# Patient Record
Sex: MALE | Race: WHITE | NOT HISPANIC OR LATINO | Employment: OTHER | URBAN - METROPOLITAN AREA
[De-identification: names, ages, dates, MRNs, and addresses within clinical notes are randomized per-mention and may not be internally consistent; named-entity substitution may affect disease eponyms.]

---

## 2025-01-04 ENCOUNTER — APPOINTMENT (OUTPATIENT)
Dept: CARDIOLOGY | Facility: HOSPITAL | Age: 70
End: 2025-01-04
Payer: MEDICARE

## 2025-01-04 ENCOUNTER — APPOINTMENT (OUTPATIENT)
Dept: RADIOLOGY | Facility: HOSPITAL | Age: 70
End: 2025-01-04
Payer: MEDICARE

## 2025-01-04 ENCOUNTER — HOSPITAL ENCOUNTER (EMERGENCY)
Facility: HOSPITAL | Age: 70
Discharge: HOME | End: 2025-01-04
Attending: EMERGENCY MEDICINE
Payer: MEDICARE

## 2025-01-04 VITALS
BODY MASS INDEX: 27.16 KG/M2 | HEIGHT: 71 IN | RESPIRATION RATE: 20 BRPM | WEIGHT: 194 LBS | DIASTOLIC BLOOD PRESSURE: 71 MMHG | HEART RATE: 55 BPM | SYSTOLIC BLOOD PRESSURE: 124 MMHG | TEMPERATURE: 98.1 F | OXYGEN SATURATION: 100 %

## 2025-01-04 DIAGNOSIS — H83.03 ACUTE LABYRINTHITIS, BILATERAL: ICD-10-CM

## 2025-01-04 DIAGNOSIS — R07.9 CHEST PAIN, UNSPECIFIED TYPE: Primary | ICD-10-CM

## 2025-01-04 LAB
ALBUMIN SERPL BCP-MCNC: 4.4 G/DL (ref 3.4–5)
ALP SERPL-CCNC: 61 U/L (ref 33–136)
ALT SERPL W P-5'-P-CCNC: 20 U/L (ref 10–52)
ANION GAP SERPL CALC-SCNC: 10 MMOL/L (ref 10–20)
AST SERPL W P-5'-P-CCNC: 20 U/L (ref 9–39)
BASOPHILS # BLD AUTO: 0.06 X10*3/UL (ref 0–0.1)
BASOPHILS NFR BLD AUTO: 1 %
BILIRUB SERPL-MCNC: 0.5 MG/DL (ref 0–1.2)
BUN SERPL-MCNC: 14 MG/DL (ref 6–23)
CALCIUM SERPL-MCNC: 9 MG/DL (ref 8.6–10.3)
CARDIAC TROPONIN I PNL SERPL HS: 6 NG/L (ref 0–20)
CARDIAC TROPONIN I PNL SERPL HS: 9 NG/L (ref 0–20)
CHLORIDE SERPL-SCNC: 104 MMOL/L (ref 98–107)
CO2 SERPL-SCNC: 28 MMOL/L (ref 21–32)
CREAT SERPL-MCNC: 1.02 MG/DL (ref 0.5–1.3)
EGFRCR SERPLBLD CKD-EPI 2021: 79 ML/MIN/1.73M*2
EOSINOPHIL # BLD AUTO: 0.11 X10*3/UL (ref 0–0.7)
EOSINOPHIL NFR BLD AUTO: 1.8 %
ERYTHROCYTE [DISTWIDTH] IN BLOOD BY AUTOMATED COUNT: 12 % (ref 11.5–14.5)
FLUAV RNA RESP QL NAA+PROBE: NOT DETECTED
FLUBV RNA RESP QL NAA+PROBE: NOT DETECTED
GLUCOSE SERPL-MCNC: 73 MG/DL (ref 74–99)
HCT VFR BLD AUTO: 46.6 % (ref 41–52)
HGB BLD-MCNC: 15.5 G/DL (ref 13.5–17.5)
IMM GRANULOCYTES # BLD AUTO: 0.02 X10*3/UL (ref 0–0.7)
IMM GRANULOCYTES NFR BLD AUTO: 0.3 % (ref 0–0.9)
LYMPHOCYTES # BLD AUTO: 1.07 X10*3/UL (ref 1.2–4.8)
LYMPHOCYTES NFR BLD AUTO: 17.9 %
MCH RBC QN AUTO: 30.1 PG (ref 26–34)
MCHC RBC AUTO-ENTMCNC: 33.3 G/DL (ref 32–36)
MCV RBC AUTO: 91 FL (ref 80–100)
MONOCYTES # BLD AUTO: 0.42 X10*3/UL (ref 0.1–1)
MONOCYTES NFR BLD AUTO: 7 %
NEUTROPHILS # BLD AUTO: 4.31 X10*3/UL (ref 1.2–7.7)
NEUTROPHILS NFR BLD AUTO: 72 %
NRBC BLD-RTO: 0 /100 WBCS (ref 0–0)
PLATELET # BLD AUTO: 241 X10*3/UL (ref 150–450)
POTASSIUM SERPL-SCNC: 4 MMOL/L (ref 3.5–5.3)
PROT SERPL-MCNC: 7.1 G/DL (ref 6.4–8.2)
RBC # BLD AUTO: 5.15 X10*6/UL (ref 4.5–5.9)
SARS-COV-2 RNA RESP QL NAA+PROBE: NOT DETECTED
SODIUM SERPL-SCNC: 138 MMOL/L (ref 136–145)
WBC # BLD AUTO: 6 X10*3/UL (ref 4.4–11.3)

## 2025-01-04 PROCEDURE — 99285 EMERGENCY DEPT VISIT HI MDM: CPT | Mod: 25 | Performed by: EMERGENCY MEDICINE

## 2025-01-04 PROCEDURE — 87636 SARSCOV2 & INF A&B AMP PRB: CPT

## 2025-01-04 PROCEDURE — 70498 CT ANGIOGRAPHY NECK: CPT | Performed by: RADIOLOGY

## 2025-01-04 PROCEDURE — 93005 ELECTROCARDIOGRAM TRACING: CPT

## 2025-01-04 PROCEDURE — 70450 CT HEAD/BRAIN W/O DYE: CPT | Performed by: RADIOLOGY

## 2025-01-04 PROCEDURE — 70498 CT ANGIOGRAPHY NECK: CPT

## 2025-01-04 PROCEDURE — 84484 ASSAY OF TROPONIN QUANT: CPT | Performed by: PHYSICIAN ASSISTANT

## 2025-01-04 PROCEDURE — 70496 CT ANGIOGRAPHY HEAD: CPT | Performed by: RADIOLOGY

## 2025-01-04 PROCEDURE — 80053 COMPREHEN METABOLIC PANEL: CPT | Performed by: PHYSICIAN ASSISTANT

## 2025-01-04 PROCEDURE — 85025 COMPLETE CBC W/AUTO DIFF WBC: CPT | Performed by: PHYSICIAN ASSISTANT

## 2025-01-04 PROCEDURE — 36415 COLL VENOUS BLD VENIPUNCTURE: CPT | Performed by: PHYSICIAN ASSISTANT

## 2025-01-04 PROCEDURE — 70450 CT HEAD/BRAIN W/O DYE: CPT | Mod: 59

## 2025-01-04 PROCEDURE — 71046 X-RAY EXAM CHEST 2 VIEWS: CPT | Performed by: RADIOLOGY

## 2025-01-04 PROCEDURE — 2550000001 HC RX 255 CONTRASTS: Performed by: EMERGENCY MEDICINE

## 2025-01-04 PROCEDURE — 71046 X-RAY EXAM CHEST 2 VIEWS: CPT

## 2025-01-04 RX ORDER — MECLIZINE HYDROCHLORIDE 25 MG/1
25 TABLET ORAL 3 TIMES DAILY PRN
Qty: 30 TABLET | Refills: 0 | Status: SHIPPED | OUTPATIENT
Start: 2025-01-04 | End: 2025-01-14

## 2025-01-04 RX ADMIN — IOHEXOL 90 ML: 350 INJECTION, SOLUTION INTRAVENOUS at 16:49

## 2025-01-04 ASSESSMENT — COLUMBIA-SUICIDE SEVERITY RATING SCALE - C-SSRS
2. HAVE YOU ACTUALLY HAD ANY THOUGHTS OF KILLING YOURSELF?: NO
6. HAVE YOU EVER DONE ANYTHING, STARTED TO DO ANYTHING, OR PREPARED TO DO ANYTHING TO END YOUR LIFE?: NO
1. IN THE PAST MONTH, HAVE YOU WISHED YOU WERE DEAD OR WISHED YOU COULD GO TO SLEEP AND NOT WAKE UP?: NO

## 2025-01-04 ASSESSMENT — HEART SCORE
HISTORY: SLIGHTLY SUSPICIOUS
TROPONIN: LESS THAN OR EQUAL TO NORMAL LIMIT
ECG: NORMAL
RISK FACTORS: NO KNOWN RISK FACTORS
HEART SCORE: 2
AGE: 65+

## 2025-01-04 NOTE — ED TRIAGE NOTES
Patient states he has felt dizzy/headache for a few days. Today went to take out garbage and shovel snow, developed chest tightness.

## 2025-01-04 NOTE — ED TRIAGE NOTES
The patient was seen and examined in triage.    History of Present Illness: The patient is a 69-year-old male presents emergency department due to dizziness for the last 2 to 3 days.  He reports that it feels like a spinning sensation.  He reports that he also has a slight headache.  He reports that he was attributing this to his sinuses because he typically gets sinus infections around this time.  He reports that today he went outside to take the garbage out and shovel snow and he developed chest tightness and some shortness of breath.  He denies associated nausea or vomiting.  He reports since resting, they chest pain and shortness of breath resolved.    Brief Physical Exam:  Exam is limited by the patient sitting in a chair in triage.   Heart: Regular rate and rhythm.   Lungs: Clear to auscultation bilaterally.   Abdomen: Soft, nondistended, normoactive bowel sounds, nontender     Plan: Appropriate labs and diagnostic imaging were ordered.      For the remainder of the patient's workup and ED course, please refer to the main ED provider note. We discussed need for diagnostic testing including laboratory studies and imaging.  We also discussed that they may be asked to wait in the waiting room while these tests are pending.  They understand that if they choose to leave without having the testing completed or resulted that we cannot rule out acute life threatening illnesses and the risks involved could lead to worsening condition, permanent disability or even death.      Disclaimer: This note was dictated by speech recognition. Minor errors in transcription may be present. Please call if questions.

## 2025-01-04 NOTE — DISCHARGE INSTRUCTIONS
Please return if you develop worsening chest pain, shortness of breath, or if you feel you need to be re-evaluated. You should follow up with your doctor or cardiologist in the next 2-3 days if possible.  Given a referral for cardiology.  We sent message to send to [facility] to schedule a stress test within the next 3 days.  Reach out to you to schedule this fairly soon.  We are giving a prescription of meclizine for the vertigo.  Recommend you some instructions on labyrinthitis should resolve likely on its own.  Return to emergency room if you have any new or worsening symptoms.

## 2025-01-04 NOTE — ED PROVIDER NOTES
History of Present Illness     History provided by: Patient  Limitations to History: None  External Records Reviewed with Brief Summary:  None    HPI:  Jayson Birch is a 69 y.o. male with no significant past medical history that presents emergency room for dizziness, headache for the past 3 days.  Patient states that for the past couple days been having some some vertigo has worsened with supination however patient is pending any vertigo currently.  Patient does state that he has some dizziness, lightheadedness on presentation.  Patient has a slight headache rated 3 out of 10 however attributes it to the sinus headache given that he typically has sinus infections around this time.  Patient additionally has been having some congestion however no productive cough.  Denies fevers, chills, runny nose, runny nose.  He states that he took out the garbage started having some chest pain and shortness of breath that lasted for 10 minutes.  On examination is not complaining of any of this chest pain or shortness of breath.  Patient states that he feels foggy.  Patient denies any associated nausea, vomiting.     Physical Exam   Triage vitals:  T 36.7 °C (98.1 °F)  HR 97  /72  RR 19  O2 97 % None (Room air)    General: Awake, alert, in no acute distress  Eyes: Gaze conjugate.  No scleral icterus or injection  HENT: Normo-cephalic, atraumatic. No stridor, bilateral tympanic membrane shows effusions, no erythema, no bulging  CV: Regular rate, regular rhythm. Radial pulses 2+ bilaterally  Resp: Breathing non-labored, speaking in full sentences.  Clear to auscultation bilaterally  GI: Soft, non-distended, non-tender. No rebound or guarding.  : Deferred  MSK/Extremities: No gross bony deformities. Moving all extremities  Skin: Warm. Appropriate color  Neuro: A&Ox3.  No slurred speech.  No facial droop.  Symmetric smile.  Equal cheek puffing.  Normal sensation to light touch in V1-3.  5/5 strength shoulder shrug.   5/5 strength in bilateral UE and LE.  Normal sensation to light touch in bilateral UE and LE. No dysmetria w/ finger-nose-finger or heel-shin bilaterally.  No ataxic gait. No tandem gait abnormalities.  Psych: Appropriate mood and affect    Medical Decision Making & ED Course   Medical Decision Makin y.o. male  with no significant past medical history that presents emergency room for dizziness, headache for the past 3 days.  Patient presents stable, nontoxic-appearing and in no acute distress.  The patient has had some congestion, sinus headaches, additional concern for COVID, influenza however swabs are negative.  Given that patient has had vertigo about 2 days ago with this headache, there is some concern for a posterior vestibular stroke however NIH is negative.  His neuroexam is completely unremarkable.  We did CT head which was initially negative and then we did a CT angio head and neck which was also negative for any acute abnormalities.  Patient is not complaining of any nausea, vomiting, any vertiginous symptoms currently here in the emergency room.  Does have some bilateral fullness of the TM which does make this concerning for viral labyrinthitis.  His chest pain is slightly concerning however patient did have any chest pain, shortness of breath.  EKG which showed no ischemic changes and serial troponins are negative.  Has a heart score of 2 which makes this less concerning for cardiac event within the next 6 weeks.  This is less concerning for ACS however may be stable angina.  Cardiology follow-up in [facility] for a stress test within the next 3 days.  Labs are unremarkable.  Patient is comfortable with this plan and follow-up with cardiologist outpatient.  Patient was given a prescription of meclizine for the vertigo associated with the acute labyrinthitis.  Pt was given strict return precautions to return to emergency room if he has any new or worsening symptoms and any blurred vision,  aphasia, weakness in his upper or lower extremities.  Patient was reassured and understood the instructions.  Patient stable prior to being discharged from emergency room.   ----  Scoring Tools Utilized: HEART Score: 2        Social Determinants of Health which Significantly Impact Care: None identified The following actions were taken to address these social determinants: None    EKG Independent Interpretation: EKG interpreted by myself. Please see ED Course for full interpretation.    Independent Result Review and Interpretation: Relevant laboratory and radiographic results were reviewed and independently interpreted by myself.  As necessary, they are commented on in the ED Course.    Chronic conditions affecting the patient's care: As documented above in [facility]    The patient was discussed with the following consultants/services: None    Care Considerations: As documented above in [facility]    ED Course:  ED Course as of 01/04/25 1958   Sat Jan 04, 2025   1619 CT head shows no acute intracranial pathology. [YG]   1619 Chest x-ray shows no acute cardiopulmonary process [YG]   1625 CBC, CMP, serial troponin is within normal limits.  [YG]   1727 Soft, influenza is negative.  [YG]   1728 CT head and neck with and without contrast shows 1. No intracranial major vascular occlusion.  2. No flow-limiting stenosis or significant plaque irregularity in  the neck.   [YG]   1746 EKG shows normal sinus rhythm, axis deviation, no interval changes, no ST ovation's, no ST depressions, no T wave inversions.  No ischemic changes. [YG]   1800 ED Attending Documentation: This patient was seen by the resident physician.  I have seen and examined the patient, agree with the workup, evaluation, management and diagnosis. I reviewed and edited the above documentation where necessary. I have looked at the EKG and agree with the interpretation. On my evaluation of the patient: Patient presents with 2 major complaints 1 being persistent intermittent  vertigo for the last 4 days, CT CTA here did not show any evidence of findings concerning for posterior stroke.  His symptoms are not persistent he has no other neurosymptoms and has a normal total and normal neuroexam for me.  Does have bilateral swelling of his tympanic membranes concerning for a reason to have labyrinthitis, however he also had some chest pain here.  Heart score is 4, troponins are negative x 2, I do think through shared decision making the patient can be followed up as an outpatient recent email for rapid cardiology follow-up.    Merrlil Valle MD  EM Attending Physician   [RG]      ED Course User Index  [RG] Merrill Valle MD  [YG] Octavia Harrell MD         Diagnoses as of 01/04/25 1958   Chest pain, unspecified type   Acute labyrinthitis, bilateral     Disposition   As a result of the work-up, the patient was discharged home.  he was informed of his diagnosis and instructed to come back with any concerns or worsening of condition.  he and was agreeable to the plan as discussed above.  he was given the opportunity to ask questions.  All of the patient's questions were answered.    Procedures   Procedures    Patient seen and discussed with ED attending physician.    Merrill Valle MD  Emergency Medicine     Octavia Harrell MD  Resident  01/04/25 1811       Merrill Valle MD  01/04/25 1958

## 2025-01-07 ENCOUNTER — OFFICE VISIT (OUTPATIENT)
Dept: CARDIOLOGY | Facility: CLINIC | Age: 70
End: 2025-01-07
Payer: MEDICARE

## 2025-01-07 VITALS
HEART RATE: 68 BPM | HEIGHT: 71 IN | BODY MASS INDEX: 27.27 KG/M2 | RESPIRATION RATE: 18 BRPM | OXYGEN SATURATION: 98 % | WEIGHT: 194.8 LBS | DIASTOLIC BLOOD PRESSURE: 60 MMHG | SYSTOLIC BLOOD PRESSURE: 110 MMHG

## 2025-01-07 DIAGNOSIS — I20.9 ANGINA PECTORIS: Primary | ICD-10-CM

## 2025-01-07 DIAGNOSIS — E78.2 MIXED HYPERLIPIDEMIA: ICD-10-CM

## 2025-01-07 PROBLEM — J31.0 RHINITIS: Status: ACTIVE | Noted: 2023-04-24

## 2025-01-07 PROBLEM — F41.1 GAD (GENERALIZED ANXIETY DISORDER): Status: ACTIVE | Noted: 2023-10-09

## 2025-01-07 PROBLEM — G47.33 OBSTRUCTIVE SLEEP APNEA SYNDROME: Status: ACTIVE | Noted: 2023-10-09

## 2025-01-07 PROBLEM — J32.9 CHRONIC SINUSITIS: Status: ACTIVE | Noted: 2023-04-24

## 2025-01-07 PROBLEM — M72.2 PLANTAR FASCIITIS, BILATERAL: Status: ACTIVE | Noted: 2024-07-15

## 2025-01-07 PROBLEM — R33.9 INCOMPLETE EMPTYING OF BLADDER: Status: ACTIVE | Noted: 2023-10-09

## 2025-01-07 PROBLEM — Z98.890 HISTORY OF NASAL SEPTOPLASTY: Status: ACTIVE | Noted: 2023-04-24

## 2025-01-07 PROBLEM — I20.0 ANGINA PECTORIS, UNSTABLE (MULTI): Status: ACTIVE | Noted: 2025-01-07

## 2025-01-07 PROCEDURE — 1159F MED LIST DOCD IN RCRD: CPT | Performed by: NURSE PRACTITIONER

## 2025-01-07 PROCEDURE — 99204 OFFICE O/P NEW MOD 45 MIN: CPT | Performed by: NURSE PRACTITIONER

## 2025-01-07 PROCEDURE — 3008F BODY MASS INDEX DOCD: CPT | Performed by: NURSE PRACTITIONER

## 2025-01-07 PROCEDURE — 1036F TOBACCO NON-USER: CPT | Performed by: NURSE PRACTITIONER

## 2025-01-07 PROCEDURE — 1160F RVW MEDS BY RX/DR IN RCRD: CPT | Performed by: NURSE PRACTITIONER

## 2025-01-07 PROCEDURE — 93000 ELECTROCARDIOGRAM COMPLETE: CPT | Performed by: NURSE PRACTITIONER

## 2025-01-07 RX ORDER — FLUOXETINE HYDROCHLORIDE 20 MG/1
20 CAPSULE ORAL DAILY
COMMUNITY
Start: 2023-04-24

## 2025-01-07 RX ORDER — MONTELUKAST SODIUM 10 MG/1
10 TABLET ORAL NIGHTLY
COMMUNITY
Start: 2023-04-24

## 2025-01-07 RX ORDER — FLUTICASONE PROPIONATE 50 MCG
1 SPRAY, SUSPENSION (ML) NASAL DAILY
COMMUNITY
Start: 2023-04-24

## 2025-01-07 RX ORDER — ROSUVASTATIN CALCIUM 10 MG/1
10 TABLET, COATED ORAL DAILY
COMMUNITY
Start: 2023-04-24

## 2025-01-07 NOTE — PROGRESS NOTES
Name : Jayson Birch   : 1955   MRN : [id number]   ENC Date : 2025    CC:   NPV- chest pain     HPI:    Jayson Birch is a relatively healthy 69 y.o. male with PMHx only sig for HLD who presents today as above.    On 25, Alfredo was out shoveling snow for about 5 mins when he began having pain & tightness in his left chest & couldn't take a deep breath. He stopped shoveling, went in the house & sat down for about 10-15 mins at which point the pain subsided. He notes that he had been feeling dizzy 4 days leading up to this event.    Went to [facility] for evaluation. Troponins negative. EKG without ischemic changes. Work up was consistent with vertigo & he was given antivert which has worked for him.    No further chest pain, but no routine exercise. Has been able to go up & down steps with laundry at home with no CP, SOB, Diaphoresis    Reports anxiety. POA for an ill friend of his that has been in hospital & causing him a lot of stress & anxiety.    Works part-time as a mobile [employer]    CV Diagnostics:  EKG 25: Sinus Rhythm with poor R wave progression across the precordium, which is likely lead placement    ROS: unless otherwise noted in the history of present illness, all other systems were reviewed and they are negative for complaints     PMH:  As above    PSH:  noncontributory    SHx:  He reports that he has never smoked. He has never used smokeless tobacco. He reports current alcohol use of about 2.0 standard drinks of alcohol per week. He reports that he does not use drugs.    FHx:  Father- Aortic Aneurysm in his 70s    Allergies:  Patient has no known allergies.    Outpatient Medications:  Current Outpatient Medications   Medication Instructions    FLUoxetine (PROZAC) 20 mg, Daily    fluticasone (Flonase) 50 mcg/actuation nasal spray 1 spray, Daily    meclizine (ANTIVERT) 25 mg, oral, 3 times daily PRN    montelukast (SINGULAIR) 10 mg, Nightly    rosuvastatin (CRESTOR) 10 mg, Daily  "    Last Recorded Vitals:  Vitals:    01/07/25 1210   BP: 110/60   BP Location: Left arm   Patient Position: Sitting   Pulse: 68   Resp: 18   SpO2: 98%   Weight: 88.4 kg (194 lb 12.8 oz)   Height: 1.803 m (5' 11\")     Physical Exam:  On exam Mr. Jayson Birch appears his stated age, is alert and oriented x3, and in no acute distress. His sclera are anicteric and his oropharynx has moist mucous membranes. His neck is supple and without thyromegaly. The JVP is ~5 cm of water above the right atrium. His cardiac exam has regular rhythm, normal S1, S2. No S3/4. There are no murmurs. His lungs are clear to auscultation bilaterally and there is no dullness to percussion. His abdomen is soft, nontender with normoactive bowel sounds. There is no HJR. The extremities are warm and without edema. The skin is dry. There is no rash present. The distal pulses are 2-3+ in all four extremities. His mood and affect are appropriate for todays encounter.      Last Labs:  CBC -  Lab Results   Component Value Date    WBC 6.0 01/04/2025    HGB 15.5 01/04/2025    HCT 46.6 01/04/2025    MCV 91 01/04/2025     01/04/2025       CMP -  Lab Results   Component Value Date    CALCIUM 9.0 01/04/2025    PROT 7.1 01/04/2025    ALBUMIN 4.4 01/04/2025    AST 20 01/04/2025    ALT 20 01/04/2025    ALKPHOS 61 01/04/2025    BILITOT 0.5 01/04/2025       LIPID PANEL -   No results found for: \"CHOL\", \"TRIG\", \"HDL\", \"CHHDL\", \"LDLF\", \"VLDL\", \"NHDL\"    RENAL FUNCTION PANEL -   Lab Results   Component Value Date    GLUCOSE 73 (L) 01/04/2025     01/04/2025    K 4.0 01/04/2025     01/04/2025    CO2 28 01/04/2025    ANIONGAP 10 01/04/2025    BUN 14 01/04/2025    CREATININE 1.02 01/04/2025    CALCIUM 9.0 01/04/2025    ALBUMIN 4.4 01/04/2025        No results found for: \"BNP\", \"HGBA1C\"  I have reviewed the above labs & diagnostics    Assessment/Plan:  Angina. EKG without ischemic changes & troponins were negative. His risk factors for CAD " include age & HLD. Given his symptoms I think it is reasonable to further investigate. Recommend nuclear stress test given his issues with balance & recent vertigo    Hyperlipidemia. Last lipid panel 12/2023 with LDL  notable for 127. Continue with Crestor 10mg every day for now. Re-evaluate after lexiscan    Follow up after testing.    Tracy M Schwab, APRN-CNP

## 2025-01-12 LAB
ATRIAL RATE: 84 BPM
P AXIS: 29 DEGREES
P OFFSET: 177 MS
P ONSET: 128 MS
PR INTERVAL: 174 MS
Q ONSET: 215 MS
QRS COUNT: 13 BEATS
QRS DURATION: 80 MS
QT INTERVAL: 366 MS
QTC CALCULATION(BAZETT): 432 MS
QTC FREDERICIA: 409 MS
R AXIS: -57 DEGREES
T AXIS: 40 DEGREES
T OFFSET: 398 MS
VENTRICULAR RATE: 84 BPM

## 2025-01-13 ENCOUNTER — HOSPITAL ENCOUNTER (OUTPATIENT)
Dept: RADIOLOGY | Facility: HOSPITAL | Age: 70
Discharge: HOME | End: 2025-01-13
Payer: MEDICARE

## 2025-01-13 ENCOUNTER — HOSPITAL ENCOUNTER (OUTPATIENT)
Dept: CARDIOLOGY | Facility: HOSPITAL | Age: 70
Discharge: HOME | End: 2025-01-13
Payer: MEDICARE

## 2025-01-13 DIAGNOSIS — I20.9 ANGINA PECTORIS: ICD-10-CM

## 2025-01-13 PROCEDURE — 93017 CV STRESS TEST TRACING ONLY: CPT

## 2025-01-13 PROCEDURE — 93018 CV STRESS TEST I&R ONLY: CPT | Performed by: INTERNAL MEDICINE

## 2025-01-13 PROCEDURE — 93016 CV STRESS TEST SUPVJ ONLY: CPT | Performed by: INTERNAL MEDICINE

## 2025-01-13 PROCEDURE — 78452 HT MUSCLE IMAGE SPECT MULT: CPT

## 2025-01-13 PROCEDURE — 3430000001 HC RX 343 DIAGNOSTIC RADIOPHARMACEUTICALS: Performed by: NURSE PRACTITIONER

## 2025-01-13 PROCEDURE — A9502 TC99M TETROFOSMIN: HCPCS | Performed by: NURSE PRACTITIONER

## 2025-01-13 PROCEDURE — 78452 HT MUSCLE IMAGE SPECT MULT: CPT | Performed by: INTERNAL MEDICINE

## 2025-01-13 RX ADMIN — TETROFOSMIN 10.6 MILLICURIE: 0.23 INJECTION, POWDER, LYOPHILIZED, FOR SOLUTION INTRAVENOUS at 08:00

## 2025-01-13 RX ADMIN — TETROFOSMIN 36 MILLICURIE: 0.23 INJECTION, POWDER, LYOPHILIZED, FOR SOLUTION INTRAVENOUS at 09:30

## 2025-01-14 ENCOUNTER — APPOINTMENT (OUTPATIENT)
Dept: CARDIOLOGY | Facility: CLINIC | Age: 70
End: 2025-01-14
Payer: MEDICARE

## 2025-01-24 ENCOUNTER — APPOINTMENT (OUTPATIENT)
Dept: CARDIOLOGY | Facility: CLINIC | Age: 70
End: 2025-01-24
Payer: MEDICARE

## 2025-01-28 ENCOUNTER — APPOINTMENT (OUTPATIENT)
Dept: CARDIOLOGY | Facility: CLINIC | Age: 70
End: 2025-01-28
Payer: MEDICARE

## 2025-01-28 DIAGNOSIS — E78.2 MIXED HYPERLIPIDEMIA: ICD-10-CM

## 2025-01-28 DIAGNOSIS — R07.9 CHEST PAIN, UNSPECIFIED TYPE: Primary | ICD-10-CM

## 2025-01-28 PROBLEM — I20.0 ANGINA PECTORIS, UNSTABLE (MULTI): Status: RESOLVED | Noted: 2025-01-07 | Resolved: 2025-01-28

## 2025-01-28 PROCEDURE — 99214 OFFICE O/P EST MOD 30 MIN: CPT | Performed by: NURSE PRACTITIONER

## 2025-01-28 PROCEDURE — 1159F MED LIST DOCD IN RCRD: CPT | Performed by: NURSE PRACTITIONER

## 2025-01-28 PROCEDURE — 1036F TOBACCO NON-USER: CPT | Performed by: NURSE PRACTITIONER

## 2025-01-28 PROCEDURE — 1160F RVW MEDS BY RX/DR IN RCRD: CPT | Performed by: NURSE PRACTITIONER

## 2025-01-28 RX ORDER — ROSUVASTATIN CALCIUM 20 MG/1
20 TABLET, COATED ORAL DAILY
Qty: 90 TABLET | Refills: 3 | Status: SHIPPED | OUTPATIENT
Start: 2025-01-28 | End: 2026-01-28

## 2025-01-28 NOTE — PROGRESS NOTES
Name : Jayson Birch   : 1955   MRN : [id number]   ENC Date : 2025    CC:   VV- follow up testing, chest pain     HPI:    Jayson Birch is a relatively healthy 69 y.o. male with PMHx only sig for HLD who presents today as above.    On 25, Alfredo was out shoveling snow for about 5 mins when he began having pain & tightness in his left chest & couldn't take a deep breath. He stopped shoveling, went in the house & sat down for about 10-15 mins at which point the pain subsided. He notes that he had been feeling dizzy 4 days leading up to this event.    Went to [facility] for evaluation. Troponins negative. EKG without ischemic changes. Work up was consistent with vertigo & he was given antivert which has worked for him.    No further chest pain, but no routine exercise. Has been able to go up & down steps with laundry at home with no CP, SOB, Diaphoresis    Reports anxiety. POA for an ill friend of his that has been in hospital & causing him a lot of stress & anxiety.    Works part-time as a mobile [employer]    CV Diagnostics:  Nuclear stress test 25: Normal, EF 65%     EKG 25: Sinus Rhythm with poor R wave progression across the precordium, which is likely lead placement    ROS: unless otherwise noted in the history of present illness, all other systems were reviewed and they are negative for complaints     PMH:  As above    PSH:  noncontributory    SHx:  He reports that he has never smoked. He has never used smokeless tobacco. He reports current alcohol use of about 2.0 standard drinks of alcohol per week. He reports that he does not use drugs.    FHx:  Father- Aortic Aneurysm in his 70s    Allergies:  Patient has no known allergies.    Outpatient Medications:  Current Outpatient Medications   Medication Instructions    FLUoxetine (PROZAC) 20 mg, Daily    fluticasone (Flonase) 50 mcg/actuation nasal spray 1 spray, Daily    montelukast (SINGULAIR) 10 mg, Nightly    rosuvastatin (CRESTOR) 20 mg,  "oral, Daily     Last Recorded Vitals:  There were no vitals filed for this visit.    Physical Exam:  A+Ox3, NAD     Last Labs:  CBC -  Lab Results   Component Value Date    WBC 6.0 01/04/2025    HGB 15.5 01/04/2025    HCT 46.6 01/04/2025    MCV 91 01/04/2025     01/04/2025       CMP -  Lab Results   Component Value Date    CALCIUM 9.0 01/04/2025    PROT 7.1 01/04/2025    ALBUMIN 4.4 01/04/2025    AST 20 01/04/2025    ALT 20 01/04/2025    ALKPHOS 61 01/04/2025    BILITOT 0.5 01/04/2025       LIPID PANEL -   No results found for: \"CHOL\", \"TRIG\", \"HDL\", \"CHHDL\", \"LDLF\", \"VLDL\", \"NHDL\"    RENAL FUNCTION PANEL -   Lab Results   Component Value Date    GLUCOSE 73 (L) 01/04/2025     01/04/2025    K 4.0 01/04/2025     01/04/2025    CO2 28 01/04/2025    ANIONGAP 10 01/04/2025    BUN 14 01/04/2025    CREATININE 1.02 01/04/2025    CALCIUM 9.0 01/04/2025    ALBUMIN 4.4 01/04/2025        No results found for: \"BNP\", \"HGBA1C\"  I have reviewed the above labs & diagnostics    Assessment/Plan:  Angina. EKG without ischemic changes & troponins were negative. His risk factors for CAD include age & HLD. Given his symptoms proceeded with Lexiscan which proved normal, with no e/o ischemia nor infarct.    Hyperlipidemia. Last lipid panel 12/2023 with LDL notable for 127. ASCVD risk of 14.2%, recommend moderate to high intensity statin. Increase crestor to 20mg at bedtime. CMP in 2 weeks. Repeat lipid panel in 6 months.    Follow up with me in 6 months or sooner if needed    Tracy M Schwab, APRN-CNP  "

## 2025-02-11 LAB
ALBUMIN SERPL-MCNC: 4.3 G/DL (ref 3.6–5.1)
ALP SERPL-CCNC: 63 U/L (ref 35–144)
ALT SERPL-CCNC: 16 U/L (ref 9–46)
ANION GAP SERPL CALCULATED.4IONS-SCNC: 6 MMOL/L (CALC) (ref 7–17)
AST SERPL-CCNC: 21 U/L (ref 10–35)
BILIRUB SERPL-MCNC: 0.7 MG/DL (ref 0.2–1.2)
BUN SERPL-MCNC: 17 MG/DL (ref 7–25)
CALCIUM SERPL-MCNC: 9 MG/DL (ref 8.6–10.3)
CHLORIDE SERPL-SCNC: 106 MMOL/L (ref 98–110)
CO2 SERPL-SCNC: 28 MMOL/L (ref 20–32)
CREAT SERPL-MCNC: 0.93 MG/DL (ref 0.7–1.28)
EGFRCR SERPLBLD CKD-EPI 2021: 88 ML/MIN/1.73M2
GLUCOSE SERPL-MCNC: 89 MG/DL (ref 65–99)
POTASSIUM SERPL-SCNC: 4.3 MMOL/L (ref 3.5–5.3)
PROT SERPL-MCNC: 6.4 G/DL (ref 6.1–8.1)
SODIUM SERPL-SCNC: 140 MMOL/L (ref 135–146)

## 2025-07-29 ENCOUNTER — APPOINTMENT (OUTPATIENT)
Dept: CARDIOLOGY | Facility: CLINIC | Age: 70
End: 2025-07-29
Payer: MEDICARE

## 2025-07-29 VITALS
OXYGEN SATURATION: 97 % | HEIGHT: 71 IN | DIASTOLIC BLOOD PRESSURE: 56 MMHG | SYSTOLIC BLOOD PRESSURE: 118 MMHG | HEART RATE: 72 BPM | RESPIRATION RATE: 18 BRPM | WEIGHT: 185.6 LBS | BODY MASS INDEX: 25.98 KG/M2

## 2025-07-29 DIAGNOSIS — R07.9 CHEST PAIN, UNSPECIFIED TYPE: ICD-10-CM

## 2025-07-29 DIAGNOSIS — E78.2 MIXED HYPERLIPIDEMIA: Primary | ICD-10-CM

## 2025-07-29 PROCEDURE — 1036F TOBACCO NON-USER: CPT | Performed by: NURSE PRACTITIONER

## 2025-07-29 PROCEDURE — 3008F BODY MASS INDEX DOCD: CPT | Performed by: NURSE PRACTITIONER

## 2025-07-29 PROCEDURE — 1159F MED LIST DOCD IN RCRD: CPT | Performed by: NURSE PRACTITIONER

## 2025-07-29 PROCEDURE — 99213 OFFICE O/P EST LOW 20 MIN: CPT | Performed by: NURSE PRACTITIONER

## 2025-07-29 PROCEDURE — 1160F RVW MEDS BY RX/DR IN RCRD: CPT | Performed by: NURSE PRACTITIONER

## 2025-07-29 NOTE — PROGRESS NOTES
Name : Jayson Birch   : 1955   MRN : [id number]   ENC Date : 2025    CC:   6 month f/u, Chest Pain, and Hyperlipidemia      HPI:    Jayson Birch is a relatively healthy 70 y.o. male with PMHx sig for HLD & chest pain who presents today as above.    On 25, Alfredo was out shoveling snow for about 5 mins when he began having pain & tightness in his left chest & couldn't take a deep breath. He stopped shoveling, went in the house & sat down for about 10-15 mins at which point the pain subsided. He notes that he had been feeling dizzy 4 days leading up to this event. Went to [facility] for evaluation. Troponins negative. EKG without ischemic changes.     Lexiscan after event was normal.    He has done very well since his last visit. Only reports that he is getting over a sinus infection & finishing up his Augmentin. Has had no hospitalizations. Denies any chest pain, pressure, SOB/JENKINS, PND, orthopnea, LE edema, palpitations, lightheadedness, dizziness, or syncope at rest or with exertions. He is compliant with his medications and reports no side effects.        Works part-time as a mobile [employer]    CV Diagnostics:  Nuclear stress test 25: Normal, EF 65%     EKG 25: Sinus Rhythm with poor R wave progression across the precordium, which is likely lead placement    ROS: unless otherwise noted in the history of present illness, all other systems were reviewed and they are negative for complaints     PMH:  As above    PSH:  noncontributory    SHx:  He reports that he has never smoked. He has never used smokeless tobacco. He reports current alcohol use of about 2.0 standard drinks of alcohol per week. He reports that he does not use drugs.    FHx:  Father- Aortic Aneurysm in his 70s    Allergies:  Patient has no known allergies.    Outpatient Medications:  Current Outpatient Medications   Medication Instructions    FLUoxetine (PROZAC) 20 mg, Daily    fluticasone (Flonase) 50 mcg/actuation nasal  "spray 1 spray, Daily    montelukast (SINGULAIR) 10 mg, Nightly    rosuvastatin (CRESTOR) 20 mg, oral, Daily     Last Recorded Vitals:  Vitals:    07/29/25 0804   BP: 118/56   BP Location: Left arm   Patient Position: Sitting   Pulse: 72   Resp: 18   SpO2: 97%   Weight: 84.2 kg (185 lb 9.6 oz)   Height: 1.803 m (5' 11\")     Physical Exam:  On exam Mr. Jayson Birch appears his stated age, is alert and oriented x3, and in no acute distress. His sclera are anicteric and his oropharynx has moist mucous membranes. His neck is supple and without thyromegaly. The JVP is ~5 cm of water above the right atrium. His cardiac exam has regular rhythm, normal S1, S2. No S3/4. There are no murmurs. His lungs are clear to auscultation bilaterally and there is no dullness to percussion. His abdomen is soft, nontender with normoactive bowel sounds. There is no HJR. The extremities are warm and without edema. The skin is dry. There is no rash present. The distal pulses are 2-3+ in all four extremities. His mood and affect are appropriate for todays encounter.      Last Labs:  CBC -  Lab Results   Component Value Date    WBC 6.0 01/04/2025    HGB 15.5 01/04/2025    HCT 46.6 01/04/2025    MCV 91 01/04/2025     01/04/2025       CMP -  Lab Results   Component Value Date    CALCIUM 9.0 02/11/2025    PROT 6.4 02/11/2025    ALBUMIN 4.3 02/11/2025    AST 21 02/11/2025    ALT 16 02/11/2025    ALKPHOS 63 02/11/2025    BILITOT 0.7 02/11/2025       LIPID PANEL -   No results found for: \"CHOL\", \"TRIG\", \"HDL\", \"CHHDL\", \"LDLF\", \"VLDL\", \"NHDL\"    RENAL FUNCTION PANEL -   Lab Results   Component Value Date    GLUCOSE 89 02/11/2025     02/11/2025    K 4.3 02/11/2025     02/11/2025    CO2 28 02/11/2025    ANIONGAP 6 (L) 02/11/2025    BUN 17 02/11/2025    CREATININE 0.93 02/11/2025    CALCIUM 9.0 02/11/2025    ALBUMIN 4.3 02/11/2025        No results found for: \"BNP\", \"HGBA1C\"  I have reviewed the above labs & " diagnostics    Assessment/Plan:  Angina. EKG was without ischemic changes & troponins were negative. His risk factors for CAD include age & HLD. Given his symptoms proceeded with Lexiscan which proved normal, with no e/o ischemia nor infarct. No further chest pain since I saw Alfredo last    Hyperlipidemia. Last lipid panel 12/2023 with LDL notable for 127. ASCVD risk of 14.2%, recommend moderate to high intensity statin. C/w crestor to 20mg at bedtime. Repeat lipid panel. Based on results, may adjust statin dose.    Follow up in 1 year or sooner as needed     Tracy M Schwab, APRN-CNP

## 2025-08-20 LAB
CHOLEST SERPL-MCNC: 154 MG/DL
CHOLEST/HDLC SERPL: 3.3 (CALC)
HDLC SERPL-MCNC: 47 MG/DL
LDLC SERPL CALC-MCNC: 85 MG/DL (CALC)
NONHDLC SERPL-MCNC: 107 MG/DL (CALC)
TRIGL SERPL-MCNC: 119 MG/DL

## 2026-07-29 ENCOUNTER — APPOINTMENT (OUTPATIENT)
Dept: CARDIOLOGY | Facility: CLINIC | Age: 71
End: 2026-07-29
Payer: MEDICARE